# Patient Record
Sex: MALE | Race: WHITE | NOT HISPANIC OR LATINO | Employment: FULL TIME | URBAN - METROPOLITAN AREA
[De-identification: names, ages, dates, MRNs, and addresses within clinical notes are randomized per-mention and may not be internally consistent; named-entity substitution may affect disease eponyms.]

---

## 2022-02-01 PROCEDURE — U0004 COV-19 TEST NON-CDC HGH THRU: HCPCS | Performed by: NURSE PRACTITIONER

## 2022-02-03 ENCOUNTER — TELEPHONE (OUTPATIENT)
Dept: URGENT CARE | Facility: CLINIC | Age: 27
End: 2022-02-03

## 2022-02-03 NOTE — TELEPHONE ENCOUNTER
----- Message from Rivka Ashley PA-C sent at 2/3/2022  9:42 AM EST -----  Please call the patient regarding his positive COVID result.

## 2023-12-08 ENCOUNTER — APPOINTMENT (OUTPATIENT)
Dept: GENERAL RADIOLOGY | Facility: HOSPITAL | Age: 28
End: 2023-12-08

## 2023-12-08 ENCOUNTER — HOSPITAL ENCOUNTER (EMERGENCY)
Facility: HOSPITAL | Age: 28
Discharge: HOME | End: 2023-12-08
Attending: EMERGENCY MEDICINE

## 2023-12-08 VITALS
SYSTOLIC BLOOD PRESSURE: 115 MMHG | TEMPERATURE: 98.2 F | RESPIRATION RATE: 16 BRPM | BODY MASS INDEX: 22.32 KG/M2 | HEART RATE: 56 BPM | HEIGHT: 67 IN | WEIGHT: 142.2 LBS | DIASTOLIC BLOOD PRESSURE: 71 MMHG | OXYGEN SATURATION: 97 %

## 2023-12-08 DIAGNOSIS — R00.2 PALPITATIONS: Primary | ICD-10-CM

## 2023-12-08 LAB
ALBUMIN SERPL-MCNC: 5 G/DL (ref 3.5–5.2)
ALBUMIN/GLOB SERPL: 1.7 G/DL
ALP SERPL-CCNC: 75 U/L (ref 39–117)
ALT SERPL W P-5'-P-CCNC: 15 U/L (ref 1–41)
ANION GAP SERPL CALCULATED.3IONS-SCNC: 12.5 MMOL/L (ref 5–15)
AST SERPL-CCNC: 26 U/L (ref 1–40)
BASOPHILS # BLD AUTO: 0.06 10*3/MM3 (ref 0–0.2)
BASOPHILS NFR BLD AUTO: 0.5 % (ref 0–1.5)
BILIRUB SERPL-MCNC: 1.1 MG/DL (ref 0–1.2)
BUN SERPL-MCNC: 19 MG/DL (ref 6–20)
BUN/CREAT SERPL: 21.8 (ref 7–25)
CALCIUM SPEC-SCNC: 9.6 MG/DL (ref 8.6–10.5)
CHLORIDE SERPL-SCNC: 99 MMOL/L (ref 98–107)
CO2 SERPL-SCNC: 24.5 MMOL/L (ref 22–29)
CREAT SERPL-MCNC: 0.87 MG/DL (ref 0.76–1.27)
DEPRECATED RDW RBC AUTO: 37 FL (ref 37–54)
EGFRCR SERPLBLD CKD-EPI 2021: 122 ML/MIN/1.73
EOSINOPHIL # BLD AUTO: 0.1 10*3/MM3 (ref 0–0.4)
EOSINOPHIL NFR BLD AUTO: 0.9 % (ref 0.3–6.2)
ERYTHROCYTE [DISTWIDTH] IN BLOOD BY AUTOMATED COUNT: 12 % (ref 12.3–15.4)
GLOBULIN UR ELPH-MCNC: 3 GM/DL
GLUCOSE SERPL-MCNC: 92 MG/DL (ref 65–99)
HCT VFR BLD AUTO: 42.3 % (ref 37.5–51)
HGB BLD-MCNC: 14.6 G/DL (ref 13–17.7)
HOLD SPECIMEN: NORMAL
HOLD SPECIMEN: NORMAL
IMM GRANULOCYTES # BLD AUTO: 0.02 10*3/MM3 (ref 0–0.05)
IMM GRANULOCYTES NFR BLD AUTO: 0.2 % (ref 0–0.5)
LYMPHOCYTES # BLD AUTO: 2.44 10*3/MM3 (ref 0.7–3.1)
LYMPHOCYTES NFR BLD AUTO: 22 % (ref 19.6–45.3)
MAGNESIUM SERPL-MCNC: 2.1 MG/DL (ref 1.6–2.6)
MCH RBC QN AUTO: 29.4 PG (ref 26.6–33)
MCHC RBC AUTO-ENTMCNC: 34.5 G/DL (ref 31.5–35.7)
MCV RBC AUTO: 85.3 FL (ref 79–97)
MONOCYTES # BLD AUTO: 0.66 10*3/MM3 (ref 0.1–0.9)
MONOCYTES NFR BLD AUTO: 6 % (ref 5–12)
NEUTROPHILS NFR BLD AUTO: 7.8 10*3/MM3 (ref 1.7–7)
NEUTROPHILS NFR BLD AUTO: 70.4 % (ref 42.7–76)
NRBC BLD AUTO-RTO: 0 /100 WBC (ref 0–0.2)
PLATELET # BLD AUTO: 291 10*3/MM3 (ref 140–450)
PMV BLD AUTO: 9.5 FL (ref 6–12)
POTASSIUM SERPL-SCNC: 4.1 MMOL/L (ref 3.5–5.2)
PROT SERPL-MCNC: 8 G/DL (ref 6–8.5)
RBC # BLD AUTO: 4.96 10*6/MM3 (ref 4.14–5.8)
SODIUM SERPL-SCNC: 136 MMOL/L (ref 136–145)
TROPONIN T SERPL HS-MCNC: <6 NG/L
TSH SERPL DL<=0.05 MIU/L-ACNC: 0.98 UIU/ML (ref 0.27–4.2)
WBC NRBC COR # BLD AUTO: 11.08 10*3/MM3 (ref 3.4–10.8)
WHOLE BLOOD HOLD COAG: NORMAL
WHOLE BLOOD HOLD SPECIMEN: NORMAL

## 2023-12-08 PROCEDURE — 84484 ASSAY OF TROPONIN QUANT: CPT | Performed by: EMERGENCY MEDICINE

## 2023-12-08 PROCEDURE — 80053 COMPREHEN METABOLIC PANEL: CPT | Performed by: EMERGENCY MEDICINE

## 2023-12-08 PROCEDURE — 36415 COLL VENOUS BLD VENIPUNCTURE: CPT

## 2023-12-08 PROCEDURE — 83735 ASSAY OF MAGNESIUM: CPT | Performed by: EMERGENCY MEDICINE

## 2023-12-08 PROCEDURE — 93005 ELECTROCARDIOGRAM TRACING: CPT

## 2023-12-08 PROCEDURE — 99284 EMERGENCY DEPT VISIT MOD MDM: CPT

## 2023-12-08 PROCEDURE — 85025 COMPLETE CBC W/AUTO DIFF WBC: CPT | Performed by: EMERGENCY MEDICINE

## 2023-12-08 PROCEDURE — 93005 ELECTROCARDIOGRAM TRACING: CPT | Performed by: EMERGENCY MEDICINE

## 2023-12-08 PROCEDURE — 84443 ASSAY THYROID STIM HORMONE: CPT | Performed by: EMERGENCY MEDICINE

## 2023-12-08 PROCEDURE — 71045 X-RAY EXAM CHEST 1 VIEW: CPT

## 2023-12-08 RX ORDER — SODIUM CHLORIDE 0.9 % (FLUSH) 0.9 %
10 SYRINGE (ML) INJECTION AS NEEDED
Status: DISCONTINUED | OUTPATIENT
Start: 2023-12-08 | End: 2023-12-08 | Stop reason: HOSPADM

## 2023-12-08 NOTE — DISCHARGE INSTRUCTIONS
Drink plenty of fluids.  Do not smoke.  Avoid caffeine or other stimulants.  Return for worsening symptoms.  Follow-up with your doctor in 3 days if no better.

## 2023-12-08 NOTE — ED PROVIDER NOTES
"Time: 1:19 PM EST  Date of encounter:  12/8/2023  Independent Historian/Clinical History and Information was obtained by:   Patient    History is limited by: N/A    Chief Complaint: palpitations      History of Present Illness:  Patient is a 28 y.o. year old male who presents to the emergency department for evaluation of palpitations.  He states that he has been having intermittent palpitations and had to leave work because of the palpitations    HPI    Patient Care Team  Primary Care Provider: Provider, No Known    Past Medical History:     No Known Allergies  History reviewed. No pertinent past medical history.  History reviewed. No pertinent surgical history.  Family History   Family history unknown: Yes       Home Medications:  Prior to Admission medications    Not on File        Social History:   Social History     Tobacco Use    Smoking status: Never    Smokeless tobacco: Never   Vaping Use    Vaping Use: Every day    Substances: Nicotine    Devices: Disposable   Substance Use Topics    Alcohol use: Never         Review of Systems:  Review of Systems   Constitutional:  Negative for chills and fever.   HENT:  Negative for congestion, ear pain and sore throat.    Eyes:  Negative for pain.   Respiratory:  Negative for cough, chest tightness and shortness of breath.    Cardiovascular:  Positive for palpitations. Negative for chest pain.   Gastrointestinal:  Negative for abdominal pain, diarrhea, nausea and vomiting.   Genitourinary:  Negative for flank pain and hematuria.   Musculoskeletal:  Negative for joint swelling.   Skin:  Negative for pallor.   Neurological:  Negative for seizures and headaches.   All other systems reviewed and are negative.       Physical Exam:  /71   Pulse 56   Temp 98.2 °F (36.8 °C) (Oral)   Resp 16   Ht 170.2 cm (67\")   Wt 64.5 kg (142 lb 3.2 oz)   SpO2 97%   BMI 22.27 kg/m²     Physical Exam  Vitals and nursing note reviewed.   Constitutional:       General: He is not in " acute distress.     Appearance: Normal appearance. He is not toxic-appearing.   HENT:      Head: Normocephalic and atraumatic.      Mouth/Throat:      Mouth: Mucous membranes are moist.   Eyes:      General: No scleral icterus.  Cardiovascular:      Rate and Rhythm: Normal rate and regular rhythm.      Pulses: Normal pulses.      Heart sounds: Normal heart sounds.   Pulmonary:      Effort: Pulmonary effort is normal. No respiratory distress.      Breath sounds: Normal breath sounds.   Abdominal:      General: Abdomen is flat.      Palpations: Abdomen is soft.      Tenderness: There is no abdominal tenderness.   Musculoskeletal:         General: Normal range of motion.      Cervical back: Normal range of motion and neck supple.   Skin:     General: Skin is warm and dry.      Capillary Refill: Capillary refill takes less than 2 seconds.   Neurological:      General: No focal deficit present.      Mental Status: He is alert and oriented to person, place, and time. Mental status is at baseline.   Psychiatric:         Mood and Affect: Mood normal.         Behavior: Behavior normal.         Thought Content: Thought content normal.         Judgment: Judgment normal.                  Procedures:  Procedures      Medical Decision Making:      Comorbidities that affect care:    None    External Notes reviewed:    Previous Clinic Note: urgent care visit for viral syndrome      The following orders were placed and all results were independently analyzed by me:  Orders Placed This Encounter   Procedures    XR Chest 1 View    [place] Draw    Comprehensive Metabolic Panel    Magnesium    Single High Sensitivity Troponin T    TSH    CBC Auto Differential    Undress & Gown    Continuous Pulse Oximetry    ECG 12 Lead ED Triage Standing Order; Dysrhythmia    CBC & Differential    Green Top (Gel)    Lavender Top    Gold Top - SST    Light Blue Top       Medications Given in the Emergency Department:  Medications - No data to display        ED Course:         Labs:    Results for orders placed or performed during the hospital encounter of 12/08/23   Comprehensive Metabolic Panel    Specimen: Arm, Right; Blood   Result Value Ref Range    Glucose 92 65 - 99 mg/dL    BUN 19 6 - 20 mg/dL    Creatinine 0.87 0.76 - 1.27 mg/dL    Sodium 136 136 - 145 mmol/L    Potassium 4.1 3.5 - 5.2 mmol/L    Chloride 99 98 - 107 mmol/L    CO2 24.5 22.0 - 29.0 mmol/L    Calcium 9.6 8.6 - 10.5 mg/dL    Total Protein 8.0 6.0 - 8.5 g/dL    Albumin 5.0 3.5 - 5.2 g/dL    ALT (SGPT) 15 1 - 41 U/L    AST (SGOT) 26 1 - 40 U/L    Alkaline Phosphatase 75 39 - 117 U/L    Total Bilirubin 1.1 0.0 - 1.2 mg/dL    Globulin 3.0 gm/dL    A/G Ratio 1.7 g/dL    BUN/Creatinine Ratio 21.8 7.0 - 25.0    Anion Gap 12.5 5.0 - 15.0 mmol/L    eGFR 122.0 >60.0 mL/min/1.73   Magnesium    Specimen: Arm, Right; Blood   Result Value Ref Range    Magnesium 2.1 1.6 - 2.6 mg/dL   Single High Sensitivity Troponin T    Specimen: Arm, Right; Blood   Result Value Ref Range    HS Troponin T <6 <22 ng/L   TSH    Specimen: Arm, Right; Blood   Result Value Ref Range    TSH 0.978 0.270 - 4.200 uIU/mL   CBC Auto Differential    Specimen: Arm, Right; Blood   Result Value Ref Range    WBC 11.08 (H) 3.40 - 10.80 10*3/mm3    RBC 4.96 4.14 - 5.80 10*6/mm3    Hemoglobin 14.6 13.0 - 17.7 g/dL    Hematocrit 42.3 37.5 - 51.0 %    MCV 85.3 79.0 - 97.0 fL    MCH 29.4 26.6 - 33.0 pg    MCHC 34.5 31.5 - 35.7 g/dL    RDW 12.0 (L) 12.3 - 15.4 %    RDW-SD 37.0 37.0 - 54.0 fl    MPV 9.5 6.0 - 12.0 fL    Platelets 291 140 - 450 10*3/mm3    Neutrophil % 70.4 42.7 - 76.0 %    Lymphocyte % 22.0 19.6 - 45.3 %    Monocyte % 6.0 5.0 - 12.0 %    Eosinophil % 0.9 0.3 - 6.2 %    Basophil % 0.5 0.0 - 1.5 %    Immature Grans % 0.2 0.0 - 0.5 %    Neutrophils, Absolute 7.80 (H) 1.70 - 7.00 10*3/mm3    Lymphocytes, Absolute 2.44 0.70 - 3.10 10*3/mm3    Monocytes, Absolute 0.66 0.10 - 0.90 10*3/mm3    Eosinophils, Absolute 0.10 0.00 - 0.40  10*3/mm3    Basophils, Absolute 0.06 0.00 - 0.20 10*3/mm3    Immature Grans, Absolute 0.02 0.00 - 0.05 10*3/mm3    nRBC 0.0 0.0 - 0.2 /100 WBC   ECG 12 Lead ED Triage Standing Order; Dysrhythmia   Result Value Ref Range    QT Interval 374 ms    QTC Interval 404 ms   Green Top (Gel)   Result Value Ref Range    Extra Tube Hold for add-ons.    Lavender Top   Result Value Ref Range    Extra Tube hold for add-on    Gold Top - SST   Result Value Ref Range    Extra Tube Hold for add-ons.    Light Blue Top   Result Value Ref Range    Extra Tube Hold for add-ons.          Lab Results (last 24 hours)       ** No results found for the last 24 hours. **             Imaging:    XR Chest 1 View    Result Date: 12/8/2023  Narrative: PROCEDURE: XR CHEST 1 VW  COMPARISON: None  INDICATIONS: palpitations, chest discomfort, and lightheadedness x 2 weeks  FINDINGS:  The lungs are clear bilaterally.  The cardiac and mediastinal silhouettes appear normal.  No effusion is seen.      Impression:   1. No acute cardiopulmonary disease       Gary Lomeli M.D.       Electronically Signed and Approved By: Gary Lomeli M.D. on 12/08/2023 at 12:20                No Radiology Exams Resulted Within Past 24 Hours      Differential Diagnosis and Discussion:    Palpitations: Differential diagnosis includes but is not limited to anxiety, atrioventricular blocks, mitral valve disease, hypoxia, coronary artery disease, hypokalemia, anemia, fever, COPD, congestive heart failure, pericarditis, Sallie-Parkinson-White syndrome, pulmonary embolism, SVT, atrial fibrillation, atrial flutter, sinus tachycardia, thyrotoxicosis, and pheochromocytoma.    All labs were reviewed and interpreted by me.  EKG was interpreted by me.    MDM     Amount and/or Complexity of Data Reviewed  Clinical lab tests: reviewed  Tests in the radiology section of CPT®: reviewed  Tests in the medicine section of CPT®: reviewed                 Patient Care Considerations:    CT CHEST: I  considered ordering a CT scan of the chest, however the patient had no signs of PE      Consultants/Shared Management Plan:    None    Social Determinants of Health:    Patient is independent, reliable, and has access to care.       Disposition and Care Coordination:    Discharged: The patient is suitable and stable for discharge with no need for consideration of observation or admission.    I have explained discharge medications and the need for follow up with the patient/caretakers. This was also printed in the discharge instructions. Patient was discharged with the following medications and follow up:      Medication List      No changes were made to your prescriptions during this visit.      Go Almendarez Jr., MD  [address] [place] [address] [id number]  [facility] [address]  [phone]    In 3 days  Call for appointment       Final diagnoses:   Palpitations        ED Disposition       ED Disposition   Discharge    Condition   Stable    Comment   --               This medical record created using voice recognition software.             Solo Thompson, DO  12/09/23 [id number]

## 2023-12-10 LAB
QT INTERVAL: 374 MS
QTC INTERVAL: 404 MS

## 2024-02-26 ENCOUNTER — OFFICE VISIT (OUTPATIENT)
Dept: CARDIOLOGY | Facility: CLINIC | Age: 29
End: 2024-02-26
Payer: COMMERCIAL

## 2024-02-26 VITALS
WEIGHT: 149 LBS | SYSTOLIC BLOOD PRESSURE: 138 MMHG | DIASTOLIC BLOOD PRESSURE: 83 MMHG | HEIGHT: 67 IN | HEART RATE: 69 BPM | BODY MASS INDEX: 23.39 KG/M2

## 2024-02-26 DIAGNOSIS — Z72.0 TOBACCO ABUSE: ICD-10-CM

## 2024-02-26 DIAGNOSIS — R42 POSTURAL DIZZINESS WITH PRESYNCOPE: ICD-10-CM

## 2024-02-26 DIAGNOSIS — R00.2 PALPITATIONS: Primary | ICD-10-CM

## 2024-02-26 DIAGNOSIS — R55 POSTURAL DIZZINESS WITH PRESYNCOPE: ICD-10-CM

## 2024-02-26 PROCEDURE — 99204 OFFICE O/P NEW MOD 45 MIN: CPT | Performed by: INTERNAL MEDICINE

## 2024-02-26 RX ORDER — BUPROPION HYDROCHLORIDE 75 MG/1
75 TABLET ORAL 2 TIMES DAILY
COMMUNITY
Start: 2024-01-11

## 2024-02-26 NOTE — PROGRESS NOTES
"Chief Complaint  Loss of Consciousness, Palpitations, and Establish Care    Subjective        Gutierrez Khan presents to [facility] CARDIOLOGY  History of present illness:    Patient is a 28-year-old male with past medical history significant for tobacco abuse and anxiety.  The patient states back in December he was having episodes of dizziness and palpitations.  He states that they would happen at the same time.  The palpitations were mainly when he was standing.  He would get dizzy with it but he feels like the room was sort of spinning.  He noted no presyncope or syncope.  He states since that time the palpitations have completely gone away.  He notes very occasional/rare dizziness.  He does workout at the gym and does cardio regularly and feels fine.  His primary care recently put him on Wellbutrin to help with anxiety and to help decrease tobacco use.  He notes occasional alcohol.  Mother and father have no heart disease.      History reviewed. No pertinent past medical history.      History reviewed. No pertinent surgical history.       Social History     Socioeconomic History    Marital status: Single   Tobacco Use    Smoking status: Never    Smokeless tobacco: Never   Vaping Use    Vaping Use: Every day    Substances: Nicotine    Devices: Disposable   Substance and Sexual Activity    Alcohol use: Never    Drug use: Never    Sexual activity: Defer         Family History   Family history unknown: Yes          No Known Allergies         Current Outpatient Medications:     buPROPion (WELLBUTRIN) 75 MG tablet, Take 1 tablet by mouth 2 (Two) Times a Day., Disp: , Rfl:       ROS:  Cardiac review of systems positive for remote palpitations and rare dizziness    Objective     /83   Pulse 69   Ht 170.2 cm (67\")   Wt 67.6 kg (149 lb)   BMI 23.34 kg/m²       General Appearance:   well developed  well nourished  HENT:   oropharynx moist  lips not cyanotic  Respiratory:  no respiratory " "distress  normal breath sounds  no rales  Cardiovascular:  no jugular venous distention  regular rhythm  S1 normal, S2 normal  no S3, no S4   no murmur  no rub, no thrill  No carotid bruit  pedal pulses normal  lower extremity edema: none    Musculoskeletal:  no clubbing of fingers.   normocephalic, head atraumatic  Skin:   warm, dry  Psychiatric:  judgement and insight appropriate  normal mood and affect    ECHO:    STRESS:    CATH:  No results found for this or any previous visit.    BMP:     Glucose   Date Value Ref Range Status   12/08/2023 92 65 - 99 mg/dL Final     BUN   Date Value Ref Range Status   12/08/2023 19 6 - 20 mg/dL Final     Creatinine   Date Value Ref Range Status   12/08/2023 0.87 0.76 - 1.27 mg/dL Final     Sodium   Date Value Ref Range Status   12/08/2023 136 136 - 145 mmol/L Final     Potassium   Date Value Ref Range Status   12/08/2023 4.1 3.5 - 5.2 mmol/L Final     Chloride   Date Value Ref Range Status   12/08/2023 99 98 - 107 mmol/L Final     CO2   Date Value Ref Range Status   12/08/2023 24.5 22.0 - 29.0 mmol/L Final     Calcium   Date Value Ref Range Status   12/08/2023 9.6 8.6 - 10.5 mg/dL Final     BUN/Creatinine Ratio   Date Value Ref Range Status   12/08/2023 21.8 7.0 - 25.0 Final     Anion Gap   Date Value Ref Range Status   12/08/2023 12.5 5.0 - 15.0 mmol/L Final     eGFR   Date Value Ref Range Status   12/08/2023 122.0 >60.0 mL/min/1.73 Final     LIPIDS:  No results found for: \"CHOL\", \"TRIG\", \"HDL\", \"LDL\", \"VLDL\", \"LDLHDL\"      Procedures             ASSESSMENT:  Diagnoses and all orders for this visit:    1. Palpitations (Primary)    2. Postural dizziness with presyncope    3. Tobacco abuse         PLAN:    1.  Patient's dizziness is hard to tell.  I do not know if these are momentary drops in blood pressure or something possibly in her ear.  He does note sort of spinning sensation.  If it continued I would consider seeing an ENT doctor.  I have asked him to make sure he is " drinking plenty of water.  2.  Blood pressure is borderline but when he was in the emergency department it was 115/71.  We will continue to monitor.  3.  Patient's palpitations have completely resolved.  4.  Cardiac physical exam is entirely normal.  5.  Reviewed patient's emergency department visit with negative troponins, TSH, hemoglobin and potassium.  His chest x-ray showed no active disease.  His EKG showed normal sinus rhythm.  6.  Encouraged continued cutting down on smoking with goal of quitting.  7.  I do think the patient could just see us back as needed.  If his palpitations came back or his dizziness got worse he can let us know.      Return if symptoms worsen or fail to improve.     Patient was given instructions and counseling regarding his condition or for health maintenance advice. Please see specific information pulled into the AVS if appropriate.         Jp Madrigal MD   2/26/2024  15:27 EST

## 2025-08-21 ENCOUNTER — TELEPHONE (OUTPATIENT)
Dept: CARDIOLOGY | Facility: CLINIC | Age: 30
End: 2025-08-21
Payer: COMMERCIAL